# Patient Record
Sex: MALE | Race: BLACK OR AFRICAN AMERICAN | NOT HISPANIC OR LATINO | Employment: OTHER | URBAN - METROPOLITAN AREA
[De-identification: names, ages, dates, MRNs, and addresses within clinical notes are randomized per-mention and may not be internally consistent; named-entity substitution may affect disease eponyms.]

---

## 2022-08-09 ENCOUNTER — ANESTHESIA EVENT (OUTPATIENT)
Dept: CARDIOLOGY | Facility: HOSPITAL | Age: 62
DRG: 036 | End: 2022-08-09
Payer: MEDICARE

## 2022-08-10 ENCOUNTER — HOSPITAL ENCOUNTER (INPATIENT)
Facility: HOSPITAL | Age: 62
LOS: 1 days | Discharge: HOME | DRG: 036 | End: 2022-08-11
Attending: INTERNAL MEDICINE | Admitting: INTERNAL MEDICINE
Payer: MEDICARE

## 2022-08-10 ENCOUNTER — ANESTHESIA (OUTPATIENT)
Dept: CARDIOLOGY | Facility: HOSPITAL | Age: 62
DRG: 036 | End: 2022-08-10
Payer: MEDICARE

## 2022-08-10 DIAGNOSIS — I25.10 CAD (CORONARY ARTERY DISEASE): ICD-10-CM

## 2022-08-10 DIAGNOSIS — I65.22 STENOSIS OF LEFT CAROTID ARTERY: Primary | ICD-10-CM

## 2022-08-10 DIAGNOSIS — I65.22 OCCLUSION OF LEFT CAROTID ARTERY: ICD-10-CM

## 2022-08-10 LAB
POC ACTIVATED CLOTTING TIME K: 132 SEC (ref 74–137)
SAMPLE: NORMAL

## 2022-08-10 PROCEDURE — 25000003 PHARM REV CODE 250: Performed by: INTERNAL MEDICINE

## 2022-08-10 PROCEDURE — 63600175 PHARM REV CODE 636 W HCPCS

## 2022-08-10 PROCEDURE — C1876 STENT, NON-COA/NON-COV W/DEL: HCPCS | Performed by: INTERNAL MEDICINE

## 2022-08-10 PROCEDURE — 25000003 PHARM REV CODE 250: Performed by: STUDENT IN AN ORGANIZED HEALTH CARE EDUCATION/TRAINING PROGRAM

## 2022-08-10 PROCEDURE — 27201423 OPTIME MED/SURG SUP & DEVICES STERILE SUPPLY: Performed by: INTERNAL MEDICINE

## 2022-08-10 PROCEDURE — C1894 INTRO/SHEATH, NON-LASER: HCPCS | Performed by: INTERNAL MEDICINE

## 2022-08-10 PROCEDURE — 63600175 PHARM REV CODE 636 W HCPCS: Performed by: ANESTHESIOLOGY

## 2022-08-10 PROCEDURE — 37252 INTRVASC US NONCORONARY 1ST: CPT | Performed by: INTERNAL MEDICINE

## 2022-08-10 PROCEDURE — 25000003 PHARM REV CODE 250

## 2022-08-10 PROCEDURE — 12000002 HC ACUTE/MED SURGE SEMI-PRIVATE ROOM

## 2022-08-10 PROCEDURE — 93005 ELECTROCARDIOGRAM TRACING: CPT

## 2022-08-10 PROCEDURE — 25500020 PHARM REV CODE 255: Performed by: INTERNAL MEDICINE

## 2022-08-10 PROCEDURE — 63600175 PHARM REV CODE 636 W HCPCS: Performed by: STUDENT IN AN ORGANIZED HEALTH CARE EDUCATION/TRAINING PROGRAM

## 2022-08-10 PROCEDURE — 37215 TRANSCATH STENT CCA W/EPS: CPT | Mod: LT | Performed by: INTERNAL MEDICINE

## 2022-08-10 PROCEDURE — 99223 PR INITIAL HOSPITAL CARE,LEVL III: ICD-10-PCS | Mod: 57,,, | Performed by: THORACIC SURGERY (CARDIOTHORACIC VASCULAR SURGERY)

## 2022-08-10 PROCEDURE — C1769 GUIDE WIRE: HCPCS | Performed by: INTERNAL MEDICINE

## 2022-08-10 PROCEDURE — 37000009 HC ANESTHESIA EA ADD 15 MINS: Performed by: INTERNAL MEDICINE

## 2022-08-10 PROCEDURE — C1753 CATH, INTRAVAS ULTRASOUND: HCPCS | Performed by: INTERNAL MEDICINE

## 2022-08-10 PROCEDURE — 93010 EKG 12-LEAD: ICD-10-PCS | Mod: ,,, | Performed by: INTERNAL MEDICINE

## 2022-08-10 PROCEDURE — 99223 1ST HOSP IP/OBS HIGH 75: CPT | Mod: 57,,, | Performed by: THORACIC SURGERY (CARDIOTHORACIC VASCULAR SURGERY)

## 2022-08-10 PROCEDURE — 93010 ELECTROCARDIOGRAM REPORT: CPT | Mod: ,,, | Performed by: INTERNAL MEDICINE

## 2022-08-10 PROCEDURE — C1725 CATH, TRANSLUMIN NON-LASER: HCPCS | Performed by: INTERNAL MEDICINE

## 2022-08-10 PROCEDURE — 37000008 HC ANESTHESIA 1ST 15 MINUTES: Performed by: INTERNAL MEDICINE

## 2022-08-10 PROCEDURE — 25000003 PHARM REV CODE 250: Performed by: NURSE ANESTHETIST, CERTIFIED REGISTERED

## 2022-08-10 PROCEDURE — 63600175 PHARM REV CODE 636 W HCPCS: Performed by: NURSE ANESTHETIST, CERTIFIED REGISTERED

## 2022-08-10 DEVICE — 9 MM X 40 MM
Type: IMPLANTABLE DEVICE | Site: CAROTID | Status: FUNCTIONAL
Brand: ENROUTE TRANSCAROTID STENT

## 2022-08-10 RX ORDER — CETIRIZINE HYDROCHLORIDE 10 MG/1
10 TABLET ORAL DAILY
Status: DISCONTINUED | OUTPATIENT
Start: 2022-08-11 | End: 2022-08-11 | Stop reason: HOSPADM

## 2022-08-10 RX ORDER — PHENYLEPHRINE HCL IN 0.9% NACL 1 MG/10 ML
SYRINGE (ML) INTRAVENOUS
Status: DISCONTINUED | OUTPATIENT
Start: 2022-08-10 | End: 2022-08-10

## 2022-08-10 RX ORDER — HYDROMORPHONE HYDROCHLORIDE 2 MG/ML
0.4 INJECTION, SOLUTION INTRAMUSCULAR; INTRAVENOUS; SUBCUTANEOUS EVERY 5 MIN PRN
Status: DISCONTINUED | OUTPATIENT
Start: 2022-08-10 | End: 2022-08-11 | Stop reason: HOSPADM

## 2022-08-10 RX ORDER — SODIUM CHLORIDE 9 MG/ML
INJECTION, SOLUTION INTRAVENOUS ONCE
Status: ACTIVE | OUTPATIENT
Start: 2022-08-10

## 2022-08-10 RX ORDER — SODIUM CHLORIDE 0.9 % (FLUSH) 0.9 %
10 SYRINGE (ML) INJECTION
Status: ACTIVE | OUTPATIENT
Start: 2022-08-10

## 2022-08-10 RX ORDER — ASPIRIN 81 MG/1
81 TABLET ORAL DAILY
COMMUNITY

## 2022-08-10 RX ORDER — MIDAZOLAM HYDROCHLORIDE 1 MG/ML
INJECTION INTRAMUSCULAR; INTRAVENOUS
Status: DISCONTINUED | OUTPATIENT
Start: 2022-08-10 | End: 2022-08-10

## 2022-08-10 RX ORDER — ALPRAZOLAM 0.5 MG/1
2 TABLET ORAL 2 TIMES DAILY
Status: DISCONTINUED | OUTPATIENT
Start: 2022-08-10 | End: 2022-08-11 | Stop reason: HOSPADM

## 2022-08-10 RX ORDER — ACETAMINOPHEN 10 MG/ML
INJECTION, SOLUTION INTRAVENOUS
Status: COMPLETED
Start: 2022-08-10 | End: 2022-08-10

## 2022-08-10 RX ORDER — EZETIMIBE 10 MG/1
10 TABLET ORAL NIGHTLY
Status: DISCONTINUED | OUTPATIENT
Start: 2022-08-10 | End: 2022-08-11 | Stop reason: HOSPADM

## 2022-08-10 RX ORDER — KETOROLAC TROMETHAMINE 30 MG/ML
INJECTION, SOLUTION INTRAMUSCULAR; INTRAVENOUS
Status: COMPLETED
Start: 2022-08-10 | End: 2022-08-10

## 2022-08-10 RX ORDER — HEPARIN SODIUM 1000 [USP'U]/ML
INJECTION, SOLUTION INTRAVENOUS; SUBCUTANEOUS
Status: DISCONTINUED | OUTPATIENT
Start: 2022-08-10 | End: 2022-08-10

## 2022-08-10 RX ORDER — PROCHLORPERAZINE EDISYLATE 5 MG/ML
5 INJECTION INTRAMUSCULAR; INTRAVENOUS EVERY 30 MIN PRN
Status: DISCONTINUED | OUTPATIENT
Start: 2022-08-10 | End: 2022-08-11 | Stop reason: HOSPADM

## 2022-08-10 RX ORDER — HYDROMORPHONE HYDROCHLORIDE 2 MG/ML
0.2 INJECTION, SOLUTION INTRAMUSCULAR; INTRAVENOUS; SUBCUTANEOUS EVERY 5 MIN PRN
Status: DISCONTINUED | OUTPATIENT
Start: 2022-08-10 | End: 2022-08-11 | Stop reason: HOSPADM

## 2022-08-10 RX ORDER — PROTAMINE SULFATE 10 MG/ML
INJECTION, SOLUTION INTRAVENOUS
Status: DISCONTINUED | OUTPATIENT
Start: 2022-08-10 | End: 2022-08-10

## 2022-08-10 RX ORDER — MIDAZOLAM HYDROCHLORIDE 1 MG/ML
2 INJECTION INTRAMUSCULAR; INTRAVENOUS ONCE AS NEEDED
Status: CANCELLED | OUTPATIENT
Start: 2022-08-10 | End: 2034-01-06

## 2022-08-10 RX ORDER — OXYCODONE HCL 20 MG/1
TABLET, FILM COATED, EXTENDED RELEASE ORAL
Status: DISPENSED
Start: 2022-08-10 | End: 2022-08-11

## 2022-08-10 RX ORDER — SODIUM CHLORIDE, SODIUM GLUCONATE, SODIUM ACETATE, POTASSIUM CHLORIDE AND MAGNESIUM CHLORIDE 30; 37; 368; 526; 502 MG/100ML; MG/100ML; MG/100ML; MG/100ML; MG/100ML
1000 INJECTION, SOLUTION INTRAVENOUS CONTINUOUS
Status: CANCELLED | OUTPATIENT
Start: 2022-08-10 | End: 2022-09-09

## 2022-08-10 RX ORDER — ATORVASTATIN CALCIUM 40 MG/1
40 TABLET, FILM COATED ORAL DAILY
Status: DISCONTINUED | OUTPATIENT
Start: 2022-08-11 | End: 2022-08-11 | Stop reason: HOSPADM

## 2022-08-10 RX ORDER — PROMETHAZINE HYDROCHLORIDE 25 MG/ML
INJECTION, SOLUTION INTRAMUSCULAR; INTRAVENOUS
Status: DISPENSED
Start: 2022-08-10 | End: 2022-08-11

## 2022-08-10 RX ORDER — METHOCARBAMOL 100 MG/ML
INJECTION, SOLUTION INTRAMUSCULAR; INTRAVENOUS
Status: DISPENSED
Start: 2022-08-10 | End: 2022-08-11

## 2022-08-10 RX ORDER — AMLODIPINE BESYLATE 2.5 MG/1
2.5 TABLET ORAL EVERY MORNING
Status: DISCONTINUED | OUTPATIENT
Start: 2022-08-11 | End: 2022-08-11 | Stop reason: HOSPADM

## 2022-08-10 RX ORDER — SILDENAFIL 25 MG/1
50 TABLET, FILM COATED ORAL DAILY
Status: DISCONTINUED | OUTPATIENT
Start: 2022-08-11 | End: 2022-08-11 | Stop reason: HOSPADM

## 2022-08-10 RX ORDER — LEVOCETIRIZINE DIHYDROCHLORIDE 5 MG/1
5 TABLET, FILM COATED ORAL DAILY
COMMUNITY
Start: 2022-05-25

## 2022-08-10 RX ORDER — RANOLAZINE 1000 MG/1
1000 TABLET, EXTENDED RELEASE ORAL 2 TIMES DAILY
COMMUNITY
Start: 2022-08-09

## 2022-08-10 RX ORDER — ATORVASTATIN CALCIUM 40 MG/1
1 TABLET, FILM COATED ORAL DAILY
COMMUNITY
Start: 2022-07-19

## 2022-08-10 RX ORDER — ONDANSETRON 2 MG/ML
4 INJECTION INTRAMUSCULAR; INTRAVENOUS DAILY PRN
Status: DISCONTINUED | OUTPATIENT
Start: 2022-08-10 | End: 2022-08-11 | Stop reason: HOSPADM

## 2022-08-10 RX ORDER — RANOLAZINE 500 MG/1
1000 TABLET, EXTENDED RELEASE ORAL 2 TIMES DAILY
Status: DISCONTINUED | OUTPATIENT
Start: 2022-08-10 | End: 2022-08-11 | Stop reason: HOSPADM

## 2022-08-10 RX ORDER — SILDENAFIL 50 MG/1
50 TABLET, FILM COATED ORAL DAILY
COMMUNITY
Start: 2022-06-23

## 2022-08-10 RX ORDER — METOPROLOL TARTRATE 25 MG/1
50 TABLET, FILM COATED ORAL 2 TIMES DAILY
Status: DISCONTINUED | OUTPATIENT
Start: 2022-08-10 | End: 2022-08-11 | Stop reason: HOSPADM

## 2022-08-10 RX ORDER — IPRATROPIUM BROMIDE AND ALBUTEROL SULFATE 2.5; .5 MG/3ML; MG/3ML
3 SOLUTION RESPIRATORY (INHALATION)
Status: DISCONTINUED | OUTPATIENT
Start: 2022-08-10 | End: 2022-08-11 | Stop reason: HOSPADM

## 2022-08-10 RX ORDER — ALPRAZOLAM 2 MG/1
2 TABLET ORAL 2 TIMES DAILY
COMMUNITY
Start: 2022-05-06

## 2022-08-10 RX ORDER — ROCURONIUM BROMIDE 10 MG/ML
INJECTION, SOLUTION INTRAVENOUS
Status: DISCONTINUED | OUTPATIENT
Start: 2022-08-10 | End: 2022-08-10

## 2022-08-10 RX ORDER — PHENYLEPHRINE HYDROCHLORIDE 10 MG/ML
INJECTION INTRAVENOUS CONTINUOUS PRN
Status: DISCONTINUED | OUTPATIENT
Start: 2022-08-10 | End: 2022-08-10

## 2022-08-10 RX ORDER — AMLODIPINE BESYLATE 2.5 MG/1
2.5 TABLET ORAL EVERY MORNING
COMMUNITY
Start: 2022-08-09

## 2022-08-10 RX ORDER — PROPOFOL 10 MG/ML
VIAL (ML) INTRAVENOUS
Status: DISCONTINUED | OUTPATIENT
Start: 2022-08-10 | End: 2022-08-10

## 2022-08-10 RX ORDER — HYDROMORPHONE HYDROCHLORIDE 2 MG/ML
INJECTION, SOLUTION INTRAMUSCULAR; INTRAVENOUS; SUBCUTANEOUS
Status: COMPLETED
Start: 2022-08-10 | End: 2022-08-10

## 2022-08-10 RX ORDER — ONDANSETRON 4 MG/1
8 TABLET, ORALLY DISINTEGRATING ORAL EVERY 6 HOURS PRN
Status: CANCELLED | OUTPATIENT
Start: 2022-08-10

## 2022-08-10 RX ORDER — ASPIRIN 81 MG/1
81 TABLET ORAL DAILY
Status: DISCONTINUED | OUTPATIENT
Start: 2022-08-11 | End: 2022-08-11 | Stop reason: HOSPADM

## 2022-08-10 RX ORDER — LIDOCAINE HYDROCHLORIDE 10 MG/ML
1 INJECTION, SOLUTION EPIDURAL; INFILTRATION; INTRACAUDAL; PERINEURAL ONCE
Status: CANCELLED | OUTPATIENT
Start: 2022-08-10 | End: 2022-08-10

## 2022-08-10 RX ORDER — LIDOCAINE HYDROCHLORIDE 20 MG/ML
INJECTION INTRAVENOUS
Status: DISCONTINUED | OUTPATIENT
Start: 2022-08-10 | End: 2022-08-10

## 2022-08-10 RX ORDER — CLOPIDOGREL BISULFATE 75 MG/1
75 TABLET ORAL DAILY
Status: DISCONTINUED | OUTPATIENT
Start: 2022-08-11 | End: 2022-08-11 | Stop reason: HOSPADM

## 2022-08-10 RX ORDER — CLOPIDOGREL BISULFATE 75 MG/1
75 TABLET ORAL DAILY
COMMUNITY
Start: 2022-07-13

## 2022-08-10 RX ORDER — FENTANYL CITRATE 50 UG/ML
INJECTION, SOLUTION INTRAMUSCULAR; INTRAVENOUS
Status: DISCONTINUED | OUTPATIENT
Start: 2022-08-10 | End: 2022-08-10

## 2022-08-10 RX ORDER — NEBIVOLOL 10 MG/1
10 TABLET ORAL DAILY
COMMUNITY
Start: 2022-07-13

## 2022-08-10 RX ORDER — MEPERIDINE HYDROCHLORIDE 25 MG/ML
12.5 INJECTION INTRAMUSCULAR; INTRAVENOUS; SUBCUTANEOUS EVERY 10 MIN PRN
Status: DISCONTINUED | OUTPATIENT
Start: 2022-08-10 | End: 2022-08-11 | Stop reason: HOSPADM

## 2022-08-10 RX ORDER — EZETIMIBE 10 MG/1
1 TABLET ORAL NIGHTLY
COMMUNITY
Start: 2022-07-13

## 2022-08-10 RX ADMIN — RANOLAZINE 1000 MG: 500 TABLET, EXTENDED RELEASE ORAL at 08:08

## 2022-08-10 RX ADMIN — MIDAZOLAM 2 MG: 1 INJECTION INTRAMUSCULAR; INTRAVENOUS at 02:08

## 2022-08-10 RX ADMIN — PHENYLEPHRINE HYDROCHLORIDE 35 MCG/MIN: 10 INJECTION INTRAVENOUS at 03:08

## 2022-08-10 RX ADMIN — HYDROMORPHONE HYDROCHLORIDE 0.4 MG: 2 INJECTION, SOLUTION INTRAMUSCULAR; INTRAVENOUS; SUBCUTANEOUS at 06:08

## 2022-08-10 RX ADMIN — HEPARIN SODIUM 8000 UNITS: 1000 INJECTION, SOLUTION INTRAVENOUS; SUBCUTANEOUS at 03:08

## 2022-08-10 RX ADMIN — Medication 100 MCG: at 04:08

## 2022-08-10 RX ADMIN — EZETIMIBE 10 MG: 10 TABLET ORAL at 08:08

## 2022-08-10 RX ADMIN — VANCOMYCIN HYDROCHLORIDE 1000 MG: 1 INJECTION, POWDER, LYOPHILIZED, FOR SOLUTION INTRAVENOUS at 03:08

## 2022-08-10 RX ADMIN — PROPOFOL 150 MG: 10 INJECTION, EMULSION INTRAVENOUS at 02:08

## 2022-08-10 RX ADMIN — ACETAMINOPHEN 1000 MG: 10 INJECTION, SOLUTION INTRAVENOUS at 05:08

## 2022-08-10 RX ADMIN — Medication 200 MCG: at 03:08

## 2022-08-10 RX ADMIN — LIDOCAINE HYDROCHLORIDE 80 MG: 20 INJECTION INTRAVENOUS at 02:08

## 2022-08-10 RX ADMIN — SODIUM CHLORIDE, SODIUM GLUCONATE, SODIUM ACETATE, POTASSIUM CHLORIDE AND MAGNESIUM CHLORIDE: 526; 502; 368; 37; 30 INJECTION, SOLUTION INTRAVENOUS at 02:08

## 2022-08-10 RX ADMIN — KETOROLAC TROMETHAMINE 30 MG: 30 INJECTION, SOLUTION INTRAMUSCULAR; INTRAVENOUS at 05:08

## 2022-08-10 RX ADMIN — HYDROMORPHONE HYDROCHLORIDE 0.4 MG: 2 INJECTION, SOLUTION INTRAMUSCULAR; INTRAVENOUS; SUBCUTANEOUS at 05:08

## 2022-08-10 RX ADMIN — ROCURONIUM BROMIDE 40 MG: 10 SOLUTION INTRAVENOUS at 02:08

## 2022-08-10 RX ADMIN — PROTAMINE SULFATE 80 MG: 10 INJECTION, SOLUTION INTRAVENOUS at 04:08

## 2022-08-10 RX ADMIN — SUGAMMADEX 200 MG: 100 INJECTION, SOLUTION INTRAVENOUS at 04:08

## 2022-08-10 RX ADMIN — ALPRAZOLAM 2 MG: 0.5 TABLET ORAL at 08:08

## 2022-08-10 RX ADMIN — FENTANYL CITRATE 100 MCG: 50 INJECTION, SOLUTION INTRAMUSCULAR; INTRAVENOUS at 02:08

## 2022-08-10 RX ADMIN — ROCURONIUM BROMIDE 20 MG: 10 SOLUTION INTRAVENOUS at 03:08

## 2022-08-10 NOTE — ANESTHESIA PREPROCEDURE EVALUATION
08/10/2022  Javy Gunn is a 62 y.o., male presents for cerebral angiogram and possible left carotid stent and due to a symptomatic carotid stenosis.  The patient noted approximately 10 years out from CABG with a subsequent coronary stents 4-5 years ago and a stress test which was reassuring 1-2 years ago.    Transthoracic echo April 2022  EF 50%  moderate to severe TR, mild MR, mild PI, trace AI  PA systolic pressure 46    Last 3 sets of Vitals    Vitals - 1 value per visit 8/9/2022 8/10/2022   SYSTOLIC - 125   DIASTOLIC - 76   Pulse - 60   Temp - 98.1   SPO2 - 98   Weight (lb) 147 -   Weight (kg) 66.679 -   Height 65 -   BMI (Calculated) 24.5 -         Lab Results   Component Value Date    WBC 6.4 05/22/2022    HGB 12.8 05/22/2022    HCT 39.1 05/22/2022    MCV 98.0 05/22/2022     05/22/2022          BMP  Lab Results   Component Value Date     05/22/2022    K 3.5 05/22/2022     05/22/2022    CO2 27 05/22/2022    BUN 10 05/22/2022    CREATININE 0.72 05/22/2022    CALCIUM 9.3 05/22/2022    ESTGFRAFRICA >90 05/22/2022    EGFRNONAA >90 05/22/2022        CMP  Sodium   Date Value Ref Range Status   05/22/2022 139 136 - 147 mmol/L Final     Potassium   Date Value Ref Range Status   05/22/2022 3.5 3.5 - 5.1 mmol/L Final     Chloride   Date Value Ref Range Status   05/22/2022 105 98 - 107 mmol/L Final     CO2   Date Value Ref Range Status   05/22/2022 27 20 - 31 mmol/L Final     BUN   Date Value Ref Range Status   05/22/2022 10 9 - 23 mg/dL Final     Creatinine   Date Value Ref Range Status   05/22/2022 0.72 0.70 - 1.30 mg/dL Final     Calcium   Date Value Ref Range Status   05/22/2022 9.3 8.3 - 10.6 mg/dL Final     eGFR    Date Value Ref Range Status   05/22/2022 >90 >90 mL/min/1.73m2 Final     Comment:     CKD-EPI GFR INTERPRETATION GUIDELINES  Estimated using CKD-EPI  equation based on standardized serum creatinine, age, and sex    DESCRIPTION               eGFR mL/min/1.73m2  Mild Decrease                           60 - 89  Moderate Decrease                       30 - 59  Severe Decrease                         15 - 29  Kidney Failure                          <15 (or dialysis)     eGFR   Date Value Ref Range Status   05/22/2022 >90 >90 mL/min/1.73m2 Final        Pre-op Assessment    I have reviewed the Patient Summary Reports.     I have reviewed the Nursing Notes. I have reviewed the NPO Status.   I have reviewed the Medications.     Review of Systems  Anesthesia Hx:  Personal Hx of Anesthesia complications Denies Post-Operative Nausea/Vomiting.  Denies Difficult Intubation.  Denies Dental Injury.   Social:  Non-Smoker    Cardiovascular:   Hypertension  Functional Capacity 3 METS  Coronary Artery Disease: S/P Percutaneous Coronary Intervention (PCI) S/P Aorto-Coronary Bypass Graft Surgery (CABG):  Valvular Heart Disease: Tricuspid Regurgitation (TR), severe   Hypertension    Pulmonary:  Pulmonary Normal        Physical Exam  General: Well nourished, Cooperative, Alert and Oriented    Airway:  Mallampati: II   Mouth Opening: Normal  TM Distance: Normal  Tongue: Normal  Neck ROM: Normal ROM    Dental:  Intact    Chest/Lungs:  Clear to auscultation, Normal Respiratory Rate    Heart:  Rate: Normal  Rhythm: Regular Rhythm        Anesthesia Plan  Type of Anesthesia, risks & benefits discussed:    Anesthesia Type: Gen ETT  Intra-op Monitoring Plan: Standard ASA Monitors and Art Line  Post Op Pain Control Plan: multimodal analgesia and IV/PO Opioids PRN  Induction:  IV  Airway Plan: Direct  Informed Consent: Informed consent signed with the Patient and all parties understand the risks and agree with anesthesia plan.  All questions answered.   ASA Score: 3  Day of Surgery Review of History & Physical: H&P Update referred to the surgeon/provider.    Ready For Surgery From Anesthesia  Perspective.     .

## 2022-08-10 NOTE — CONSULTS
[facility] - Cath Lab Services  Cardiothoracic Surgery  Consult Note    Patient Name: Javy Gunn  MRN: [id number]  Admission Date: 8/10/2022  Attending Physician: Vaibhav Enriquez MD  Referring Provider: Vaibhav Enriquez MD    Patient information was obtained from patient, ER records and primary team.     Inpatient consult to Cardiothoracic Surgery  Consult performed by: Chi Rice MD  Consult ordered by: Vaibhav Enriquez MD  Reason for consult:  left internal carotid artery stenosis  Assessment/Recommendations: Patient is a 62-year-old male found to have a severe left internal carotid artery stenosis judged to be 80% by NASCET criteria on recent CTA.  I agree with the recommendation for left carotid endarterectomy versus left TCAR procedure.  Risks, benefits, and alternatives have been discussed.  Questions have been answered.  Patient voices understanding and agrees to proceed with TCAR procedure        Subjective:     Chief Complaint/Reason for Admission: Neck blockage    History of Present Illness: Patient is a 62-year-old black male long known to [facility] with a past medical history coronary artery disease status post coronary artery bypass grafting, peripheral arterial disease, and recent discovery of an 80% left internal carotid artery stenosis.    The patient denies fever, chills, nausea, vomiting, headache, syncope, visual changes, lateralizing neurological signs or symptoms.    No current facility-administered medications on file prior to encounter.     Current Outpatient Medications on File Prior to Encounter   Medication Sig    ALPRAZolam (XANAX) 2 MG Tab Take 2 mg by mouth 2 (two) times daily.    amLODIPine (NORVASC) 2.5 MG tablet Take 2.5 mg by mouth every morning.    aspirin (ECOTRIN) 81 MG EC tablet Take 81 mg by mouth once daily.    atorvastatin (LIPITOR) 40 MG tablet Take 1 tablet by mouth once daily.    clopidogreL (PLAVIX) 75 mg tablet Take 75 mg by mouth once daily.    ezetimibe  (ZETIA) 10 mg tablet Take 1 tablet by mouth every evening.    levocetirizine (XYZAL) 5 MG tablet Take 5 mg by mouth once daily.    nebivoloL (BYSTOLIC) 10 MG Tab Take 10 mg by mouth once daily.    ranolazine (RANEXA) 1,000 mg Tb12 Take 1,000 mg by mouth 2 (two) times daily.    sildenafiL (VIAGRA) 50 MG tablet Take 50 mg by mouth once daily.       Review of patient's allergies indicates:   Allergen Reactions    Penicillins Rash       Past Medical History:   Diagnosis Date    Anxiety     Arthritis     Carotid stenosis     Chest pain     Coronary artery disease     DVT (deep venous thrombosis)     Hyperlipidemia     Hypertension     Other pulmonary embolism without acute cor pulmonale     Valvular heart disease      Past Surgical History:   Procedure Laterality Date    ARM REPLANTATION Left 1985    COLONOSCOPY      CORONARY ARTERY BYPASS GRAFT (CABG)      CORONARY STENT PLACEMENT      ESOPHAGOGASTRODUODENOSCOPY      IVC FILTER RETRIEVAL       Family History    None       Tobacco Use    Smoking status: Never Smoker    Smokeless tobacco: Never Used   Substance and Sexual Activity    Alcohol use: Yes     Alcohol/week: 7.0 standard drinks     Types: 7 Shots of liquor per week     Comment: 1 cocktail daily    Drug use: Never    Sexual activity: Not on file     Review of Systems   Constitutional: Negative for activity change, chills, diaphoresis and fever.   HENT: Positive for sore throat. Negative for congestion, dental problem, drooling, mouth sores, trouble swallowing and voice change.    Eyes: Negative.    Respiratory: Negative for apnea, cough, choking, chest tightness, shortness of breath, wheezing and stridor.    Cardiovascular: Negative for chest pain, palpitations and leg swelling.   Gastrointestinal: Negative for abdominal distention, abdominal pain, blood in stool, nausea and vomiting.   Endocrine: Negative.    Musculoskeletal: Negative for arthralgias, back pain, gait problem, neck pain  and neck stiffness.   Skin: Negative for color change, pallor, rash and wound.   Allergic/Immunologic: Negative.    Neurological: Positive for headaches. Negative for dizziness, seizures, syncope, facial asymmetry, weakness and light-headedness.   Hematological: Negative for adenopathy.   Psychiatric/Behavioral: Negative for agitation and hallucinations.     Objective:     Vital Signs (Most Recent):  Temp: 98.1 °F (36.7 °C) (08/10/22 0755)  Pulse: 60 (08/10/22 0755)  BP: 125/76 (08/10/22 0755)  SpO2: 98 % (08/10/22 0755) Vital Signs (24h Range):  Temp:  [98.1 °F (36.7 °C)] 98.1 °F (36.7 °C)  Pulse:  [60] 60  SpO2:  [98 %] 98 %  BP: (125)/(76) 125/76     Weight: 66.3 kg (146 lb 2.6 oz)  Body mass index is 24.32 kg/m².    SpO2: 98 %        Intake/Output - Last 3 Shifts     None           Lines/Drains/Airways     Arterial Line  Duration           Arterial Line 08/10/22 1300 Radial <1 day          Peripheral Intravenous Line  Duration                Peripheral IV - Single Lumen 08/10/22 0915 20 G Anterior;Proximal;Right Forearm <1 day                 STS Risk Score: n/a    Physical Exam  Vitals and nursing note reviewed.   Constitutional:       General: He is not in acute distress.     Appearance: Normal appearance.      Comments: Frail   HENT:      Head: Normocephalic and atraumatic.      Nose: Nose normal. No congestion.      Mouth/Throat:      Mouth: Mucous membranes are moist.   Eyes:      General: No scleral icterus.     Extraocular Movements: Extraocular movements intact.      Conjunctiva/sclera: Conjunctivae normal.      Pupils: Pupils are equal, round, and reactive to light.   Neck:      Thyroid: No thyroid mass or thyromegaly.      Vascular: Carotid bruit present. No JVD.   Cardiovascular:      Rate and Rhythm: Normal rate and regular rhythm.      Pulses: Normal pulses.           Carotid pulses are 2+ on the right side and 2+ on the left side.       Radial pulses are 2+ on the right side and 2+ on the left  side.        Femoral pulses are 2+ on the right side and 2+ on the left side.       Dorsalis pedis pulses are 2+ on the right side and 2+ on the left side.        Posterior tibial pulses are 2+ on the right side and 2+ on the left side.      Heart sounds: No murmur heard.    No friction rub. No gallop.   Pulmonary:      Effort: Pulmonary effort is normal. No respiratory distress.      Breath sounds: Normal breath sounds. No stridor. No wheezing, rhonchi or rales.   Chest:      Chest wall: No tenderness.   Breasts:      Right: No axillary adenopathy or supraclavicular adenopathy.      Left: No axillary adenopathy or supraclavicular adenopathy.       Abdominal:      General: Abdomen is flat. Bowel sounds are normal. There is no distension.      Palpations: Abdomen is soft. There is no hepatomegaly, splenomegaly, mass or pulsatile mass.      Tenderness: There is no abdominal tenderness. There is no rebound.   Musculoskeletal:         General: No swelling. Normal range of motion.      Cervical back: Normal range of motion. No rigidity or tenderness.      Right lower leg: No edema.      Left lower leg: No edema.   Lymphadenopathy:      Cervical: No cervical adenopathy.      Upper Body:      Right upper body: No supraclavicular or axillary adenopathy.      Left upper body: No supraclavicular or axillary adenopathy.   Skin:     General: Skin is warm and dry.   Neurological:      General: No focal deficit present.      Mental Status: He is alert and oriented to person, place, and time. Mental status is at baseline.      Cranial Nerves: No cranial nerve deficit.      Sensory: No sensory deficit.      Motor: No weakness.      Gait: Gait normal.   Psychiatric:         Mood and Affect: Mood normal.         Significant Labs:  All pertinent labs from the last 24 hours have been reviewed.    Significant Diagnostics:  I have reviewed and interpreted all pertinent imaging results/findings within the past 24  hours.    Assessment/Plan:     NYHA Score: NYHA I: cardiac disease, but without resulting limitations of physical activity    There are no hospital problems to display for this patient.      Thank you for your consult. I will follow-up with patient. Please contact us if you have any additional questions.    Chi Rice MD  Cardiothoracic Surgery  [facility] - Cath Lab Services

## 2022-08-10 NOTE — ANESTHESIA PROCEDURE NOTES
Peripheral IV Insertion    Diagnosis: I99.8 Other disorder of circulatory system    Patient location during procedure: OR  Procedure end time: 8/10/2022 3:00 PM    Staffing  Authorizing Provider: Juan Ramon Gunn Jr., MD  Performing Provider: Juan Ramon Gunn Jr., MD    Anesthesiologist was present at the time of the procedure.    Preanesthetic Checklist  Completed: patient identified, IV checked, site marked, risks and benefits discussed, surgical consent, monitors and equipment checked, pre-op evaluation, timeout performed and anesthesia consent givenPeripheral IV Insertion  Skin Prep: alcohol swabs  Local Infiltration: none  Orientation: right  Location: upper arm  Catheter Type: peripheral IV (single lumen)  Catheter Size: 22 G  Catheter placement by Anatomical landmarks. Heme positive aspiration all ports. Insertion Attempts: 2  Assessment  Dressing: secured with tape and tegaderm  Patient: Tolerated well  Line flushed easily.

## 2022-08-10 NOTE — OP NOTE
[facility]  Cath Lab Services  Cardiothoracic Surgery  Operative Note    SUMMARY     Date of Procedure: 8/10/2022     Procedure:  1. Left carotid TCAR procedure  2. Left carotid artery IVUS  3. Left carotid artery percutaneous transluminal lithotripsy     Co-Surgeon: SILVINA Rice / Vaibhav Enriquez    Assistant: Rocky Barbosa    Pre-Operative Diagnosis:  1.  Severe, symptomatic left internal carotid artery stenosis  2. Coronary artery disease     Post-Operative Diagnosis:  Same    Anesthesia: General    Operative Findings (including complications, if any):  Severely calcified left internal carotid artery stenosis with the appearance of an old healed dissection    Description of Technical Procedures:  The patient was lived to the cath lab and prepped and draped in the usual sterile fashion.  A right supraclavicular incision was made in a transverse fashion the subcutaneous tissues were dissected with the Bovie electric cautery.  Common carotid artery was identified and encircled with a vessel loop.  A 5 0 Prolene suture was used to make a pursestring in the anterior surface of the carotid artery.  Left common femoral vein access was obtained with ultrasound guidance and a 9 [place] femoral access sheath was placed in the usual fashion.  The right common carotid artery was then accessed with a micropuncture needle and a micropuncture sheath was inserted.  Diagnostic carotid angiogram was performed.  The diagnostic sheath was then replaced with an Amplatz Super Stiff wire and the working sheath for the TCAR procedure was inserted under fluoroscopic guidance.  The sheath system was connected to the femoral vein and the [place] flow reversal device was assessed and found to be working appropriately.  The internal carotid artery was then accessed with a 0.014 mm wire and intravascular ultrasound was performed to assess the degree of stenosis.  Following this procedure  a balloon lithotripsy was  performed on the the internal carotid artery lesion.  This balloon was inflated to nominal pressure and then released.  The  [place] en route stent system was then passed over the wire and delivered to the area in question and deployed in the usual fashion.  Completion angiography revealed that the stent was well expanded and there was no evidence of stenosis.  The delivery system was removed and the carotid sheath was then removed with tying of the 5 0 Prolene suture with hemostasis.  Subcutaneous tissues were irrigated with saline irrigation and closed in layers with Vicryl.  Patient tolerated the procedure well and will be delivered to the recovery room in stable condition.    Estimated Blood Loss (EBL): 5 mL          Specimens:   Specimen (24h ago, onward)            None                  Condition: Stable    Disposition: PACU - hemodynamically stable.

## 2022-08-10 NOTE — TRANSFER OF CARE
"Anesthesia Transfer of Care Note    Patient: Javy Gunn    Procedure(s) Performed: Procedure(s) (LRB):  ANGIOGRAM, CAROTID (N/A)  Stent, Carotid W/ Protect    Patient location: PACU    Transport from OR: Transported from OR on room air with adequate spontaneous ventilation    Post pain: adequate analgesia    Post vital signs: stable    Level of consciousness: awake and alert    Nausea/Vomiting: no nausea/vomiting    Complications: none    Transfer of care protocol was followed      Last vitals:   Visit Vitals  /76   Pulse 60   Temp 36.7 °C (98.1 °F) (Oral)   Ht 5' 5" (1.651 m)   Wt 66.3 kg (146 lb 2.6 oz)   SpO2 98%   BMI 24.32 kg/m²     "

## 2022-08-10 NOTE — Clinical Note
The groin and left neck was prepped. The site was prepped with ChloraPrep and Ioban. The site was clipped. The patient was draped. The patient was positioned supine. The patient was secured with ulnar pads.

## 2022-08-10 NOTE — BRIEF OP NOTE
[facility] - Cath Lab Services  Brief Operative Note  Cardiology    SUMMARY     Surgery Date: 8/10/2022     Surgeon(s) and Role:     * Vaibhav Enriquez MD - Primary     * Chi Rice MD - Co-Surgeon    Pre-op Diagnosis:  Occlusion of left carotid artery [I65.22]    Post-op Diagnosis: Post-Op Diagnosis Codes:     * Occlusion of left carotid artery [I65.22]    Procedure Performed: TCAR        Operative Findings: TCAR performed for left internal carotid artery.  75% stenosis was noted.  Flow reversal was performed. Balloon angioplasty and stenting was performed successfully.  Patient to continue on aspirin and Plavix. Patient will be admitted overnight.  Aspirin and Plavix to be continued with statins on DC     Full report to follow

## 2022-08-10 NOTE — ANESTHESIA PROCEDURE NOTES
Intubation    Date/Time: 8/10/2022 2:50 PM  Performed by: Barb Palumbo CRNA  Authorized by: Juan Ramon Gunn Jr., MD     Intubation:     Induction:  Intravenous    Intubated:  Postinduction    Mask Ventilation:  Easy mask    Attempts:  1    Attempted By:  Staff anesthesiologist and CRNA    Method of Intubation:  Direct    Blade:  Puente 2    Laryngeal View Grade: Grade I - full view of cords      Difficult Airway Encountered?: No      Complications:  None    Airway Device:  Oral endotracheal tube    Airway Device Size:  7.5    Style/Cuff Inflation:  Cuffed    Inflation Amount (mL):  7    Tube secured:  22    Secured at:  The lips    Placement Verified By:  Capnometry    Complicating Factors:  None    Findings Post-Intubation:  BS equal bilateral and atraumatic/condition of teeth unchanged

## 2022-08-10 NOTE — ANESTHESIA PROCEDURE NOTES
Arterial    Diagnosis: CAD    Patient location during procedure: done in OR  Procedure start time: 8/10/2022 2:38 PM  Timeout: 8/10/2022 2:37 PM  Procedure end time: 8/10/2022 2:41 PM    Staffing  Authorizing Provider: Juan Ramon Gunn Jr., MD  Performing Provider: Barb Palumbo CRNA    Anesthesiologist was present at the time of the procedure.    Preanesthetic Checklist  Completed: patient identified, IV checked, site marked, risks and benefits discussed, surgical consent, monitors and equipment checked, pre-op evaluation, timeout performed and anesthesia consent givenArterial  Skin Prep: alcohol swabs  Local Infiltration: lidocaine  Orientation: right  Location: radial    Catheter Size: 20 G  Catheter placement by Anatomical landmarks. Heme positive aspiration all ports. Insertion Attempts: 1  Assessment  Dressing: secured with tape and tegaderm  Patient: Tolerated well

## 2022-08-10 NOTE — Clinical Note
dry, intact, no bleeding and no hematoma. Right groin sheath left in place. Site covered with tegaderm.

## 2022-08-11 VITALS
HEART RATE: 74 BPM | SYSTOLIC BLOOD PRESSURE: 110 MMHG | WEIGHT: 146.19 LBS | DIASTOLIC BLOOD PRESSURE: 65 MMHG | RESPIRATION RATE: 18 BRPM | HEIGHT: 65 IN | BODY MASS INDEX: 24.36 KG/M2 | TEMPERATURE: 98 F | OXYGEN SATURATION: 98 %

## 2022-08-11 PROBLEM — I65.29 CAROTID STENOSIS: Status: ACTIVE | Noted: 2022-08-11

## 2022-08-11 LAB
BASOPHILS # BLD AUTO: 0.02 X10(3)/MCL (ref 0–0.2)
BASOPHILS NFR BLD AUTO: 0.3 %
EOSINOPHIL # BLD AUTO: 0.03 X10(3)/MCL (ref 0–0.9)
EOSINOPHIL NFR BLD AUTO: 0.5 %
ERYTHROCYTE [DISTWIDTH] IN BLOOD BY AUTOMATED COUNT: 11.9 % (ref 11.5–17)
HCT VFR BLD AUTO: 39.5 % (ref 42–52)
HGB BLD-MCNC: 12.5 GM/DL (ref 14–18)
IMM GRANULOCYTES # BLD AUTO: 0.02 X10(3)/MCL (ref 0–0.04)
IMM GRANULOCYTES NFR BLD AUTO: 0.3 %
LYMPHOCYTES # BLD AUTO: 0.51 X10(3)/MCL (ref 0.6–4.6)
LYMPHOCYTES NFR BLD AUTO: 7.7 %
MCH RBC QN AUTO: 32.1 PG (ref 27–31)
MCHC RBC AUTO-ENTMCNC: 31.6 MG/DL (ref 33–36)
MCV RBC AUTO: 101.3 FL (ref 80–94)
MONOCYTES # BLD AUTO: 0.47 X10(3)/MCL (ref 0.1–1.3)
MONOCYTES NFR BLD AUTO: 7.1 %
NEUTROPHILS # BLD AUTO: 5.6 X10(3)/MCL (ref 2.1–9.2)
NEUTROPHILS NFR BLD AUTO: 84.1 %
NRBC BLD AUTO-RTO: 0 %
PLATELET # BLD AUTO: 151 X10(3)/MCL (ref 130–400)
PMV BLD AUTO: 10.6 FL (ref 7.4–10.4)
RBC # BLD AUTO: 3.9 X10(6)/MCL (ref 4.7–6.1)
WBC # SPEC AUTO: 6.7 X10(3)/MCL (ref 4.5–11.5)

## 2022-08-11 PROCEDURE — 36415 COLL VENOUS BLD VENIPUNCTURE: CPT | Performed by: INTERNAL MEDICINE

## 2022-08-11 PROCEDURE — 25000003 PHARM REV CODE 250: Performed by: INTERNAL MEDICINE

## 2022-08-11 PROCEDURE — 63600175 PHARM REV CODE 636 W HCPCS: Performed by: ANESTHESIOLOGY

## 2022-08-11 PROCEDURE — 85025 COMPLETE CBC W/AUTO DIFF WBC: CPT | Performed by: INTERNAL MEDICINE

## 2022-08-11 RX ORDER — OXYCODONE AND ACETAMINOPHEN 10; 325 MG/1; MG/1
1 TABLET ORAL EVERY 4 HOURS PRN
Status: DISCONTINUED | OUTPATIENT
Start: 2022-08-11 | End: 2022-08-11 | Stop reason: HOSPADM

## 2022-08-11 RX ORDER — OXYCODONE AND ACETAMINOPHEN 5; 325 MG/1; MG/1
1 TABLET ORAL EVERY 4 HOURS PRN
Qty: 15 TABLET | Refills: 0 | Status: SHIPPED | OUTPATIENT
Start: 2022-08-11

## 2022-08-11 RX ADMIN — CETIRIZINE HYDROCHLORIDE 10 MG: 10 TABLET, FILM COATED ORAL at 08:08

## 2022-08-11 RX ADMIN — RANOLAZINE 1000 MG: 500 TABLET, EXTENDED RELEASE ORAL at 08:08

## 2022-08-11 RX ADMIN — CLOPIDOGREL 75 MG: 75 TABLET, FILM COATED ORAL at 08:08

## 2022-08-11 RX ADMIN — ATORVASTATIN CALCIUM 40 MG: 40 TABLET, FILM COATED ORAL at 08:08

## 2022-08-11 RX ADMIN — OXYCODONE AND ACETAMINOPHEN 1 TABLET: 10; 325 TABLET ORAL at 10:08

## 2022-08-11 RX ADMIN — MEPERIDINE HYDROCHLORIDE 12.5 MG: 25 INJECTION INTRAMUSCULAR; INTRAVENOUS; SUBCUTANEOUS at 03:08

## 2022-08-11 RX ADMIN — AMLODIPINE BESYLATE 2.5 MG: 2.5 TABLET ORAL at 08:08

## 2022-08-11 RX ADMIN — SILDENAFIL 50 MG: 25 TABLET, FILM COATED ORAL at 08:08

## 2022-08-11 RX ADMIN — ASPIRIN 81 MG: 81 TABLET, COATED ORAL at 08:08

## 2022-08-11 RX ADMIN — METOPROLOL TARTRATE 50 MG: 25 TABLET, FILM COATED ORAL at 08:08

## 2022-08-11 NOTE — DISCHARGE SUMMARY
[facility] - Cath Lab Services  Cardiology  Discharge Summary    Patient Name: Javy Gunn  MRN: [id number]  Admission Date: 8/10/2022  Hospital Length of Stay: 1 days  Code Status: No Order   Attending Physician: No att. providers found   Primary Care Physician: Dolly Todd MD  Expected Discharge Date: 8/11/2022  Principal Problem:Carotid stenosis    Subjective:     Brief HPI:   Mr. Gunn is a 62 year old, known to Dr. Enriquez, who presented to [facility] on 8.10.22 for a scheduled TCAR. He has a history of CHF, carotid bruit, DM 2, HTN, DM 2, CAD s/p CABG, insomnia, and hypothyroidism. He was c/o numbness to his fingertips prior to his procedure. On 8.10.22, he underwent a successful TCAR and will be d/c'd home today.    Hospital Course:   TCAR 8.10.22  The Ost L ICA to Prox L ICA lesion was 75% stenosed with 20% stenosis post-intervention.  A STENT ENROUTE CAROTID [id number] stent was successfully placed.         Review of Systems   Cardiovascular: Negative.    Neurological: Negative.    All other systems reviewed and are negative.      Objective:     Vital Signs (Most Recent):  Temp: 97.9 °F (36.6 °C) (08/11/22 1100)  Pulse: 74 (08/11/22 1100)  Resp: 18 (08/11/22 1100)  BP: 110/65 (08/11/22 1100)  SpO2: 98 % (08/11/22 1100) Vital Signs (24h Range):  Temp:  [97.9 °F (36.6 °C)] 97.9 °F (36.6 °C)  Pulse:  [48-74] 74  Resp:  [0-23] 18  SpO2:  [90 %-100 %] 98 %  BP: ()/(54-84) 110/65     Weight: 66.3 kg (146 lb 2.6 oz)  Body mass index is 24.32 kg/m².    SpO2: 98 %  O2 Device (Oxygen Therapy): ventilator      Intake/Output Summary (Last 24 hours) at 8/11/2022 1203  Last data filed at 8/11/2022 0500  Gross per 24 hour   Intake 1170 ml   Output 375 ml   Net 795 ml       Lines/Drains/Airways     None                 Significant Labs:   Recent Results (from the past 72 hour(s))   ISTAT ACT-K    Collection Time: 08/10/22  5:12 PM   Result Value Ref Range    POC ACTIVATED CLOTTING TIME K 132 74 -  137 sec    Sample unknown    CBC with Differential    Collection Time: 08/11/22  6:41 AM   Result Value Ref Range    WBC 6.7 4.5 - 11.5 x10(3)/mcL    RBC 3.90 (L) 4.70 - 6.10 x10(6)/mcL    Hgb 12.5 (L) 14.0 - 18.0 gm/dL    Hct 39.5 (L) 42.0 - 52.0 %    .3 (H) 80.0 - 94.0 fL    MCH 32.1 (H) 27.0 - 31.0 pg    MCHC 31.6 (L) 33.0 - 36.0 mg/dL    RDW 11.9 11.5 - 17.0 %    Platelet 151 130 - 400 x10(3)/mcL    MPV 10.6 (H) 7.4 - 10.4 fL    Neut % 84.1 %    Lymph % 7.7 %    Mono % 7.1 %    Eos % 0.5 %    Basophil % 0.3 %    Lymph # 0.51 (L) 0.6 - 4.6 x10(3)/mcL    Neut # 5.6 2.1 - 9.2 x10(3)/mcL    Mono # 0.47 0.1 - 1.3 x10(3)/mcL    Eos # 0.03 0 - 0.9 x10(3)/mcL    Baso # 0.02 0 - 0.2 x10(3)/mcL    IG# 0.02 0 - 0.04 x10(3)/mcL    IG% 0.3 %    NRBC% 0.0 %       Telemetry:  NSR    Physical Exam  Vitals reviewed.   Constitutional:       Appearance: Normal appearance.   Cardiovascular:      Rate and Rhythm: Normal rate and regular rhythm.      Pulses: Normal pulses.      Heart sounds: Normal heart sounds.      Comments: Left neck incision C/D/I and right groin site benign-no redness, drainage, or edema.  Pulmonary:      Effort: Pulmonary effort is normal.      Breath sounds: Normal breath sounds.   Abdominal:      General: Abdomen is flat. Bowel sounds are normal.      Palpations: Abdomen is soft.   Musculoskeletal:         General: Normal range of motion.   Skin:     General: Skin is warm and dry.      Capillary Refill: Capillary refill takes less than 2 seconds.   Neurological:      Mental Status: He is alert and oriented to person, place, and time.         Current Inpatient Medications:    Current Facility-Administered Medications:     0.9%  NaCl infusion, , Intravenous, Once, Vaibhav Enriquez MD    albuterol-ipratropium 2.5 mg-0.5 mg/3 mL nebulizer solution 3 mL, 3 mL, Nebulization, Q1H PRN, Juan Ramon Gunn Jr., MD    ALPRAZolam tablet 2 mg, 2 mg, Oral, BID, Vaibhav Enriquez MD, 2 mg at 08/10/22 2045    amLODIPine tablet  2.5 mg, 2.5 mg, Oral, QAM, Vaibhav Enriquez MD, 2.5 mg at 08/11/22 0813    aspirin EC tablet 81 mg, 81 mg, Oral, Daily, Vaibhav Enriquez MD, 81 mg at 08/11/22 0814    atorvastatin tablet 40 mg, 40 mg, Oral, Daily, Vaibhav Enriquez MD, 40 mg at 08/11/22 0814    cetirizine tablet 10 mg, 10 mg, Oral, Daily, Vaibhav Enriquez MD, 10 mg at 08/11/22 0815    clopidogreL tablet 75 mg, 75 mg, Oral, Daily, Vaibhav Enriquez MD, 75 mg at 08/11/22 0814    ezetimibe tablet 10 mg, 10 mg, Oral, QHS, Vaibhav Enriquez MD, 10 mg at 08/10/22 2045    HYDROmorphone (PF) injection 0.2 mg, 0.2 mg, Intravenous, Q5 Min PRN, Juan Ramon Gunn Jr., MD    HYDROmorphone (PF) injection 0.4 mg, 0.4 mg, Intravenous, Q5 Min PRN, Juan Ramon Gunn Jr., MD, 0.4 mg at 08/10/22 1845    iopamidoL (ISOVUE-370) injection, , , PRN, Vaibhav Enriquez MD, 30 mL at 08/10/22 1624    meperidine (PF) injection 12.5 mg, 12.5 mg, Intravenous, Q10 Min PRN, Juan Ramon Gunn Jr., MD, 12.5 mg at 08/11/22 0313    metoprolol tartrate (LOPRESSOR) tablet 50 mg, 50 mg, Oral, BID, Vaibhav Enriquez MD, 50 mg at 08/11/22 0813    ondansetron injection 4 mg, 4 mg, Intravenous, Daily PRN, Juan Ramon Gunn Jr., MD    oxyCODONE-acetaminophen  mg per tablet 1 tablet, 1 tablet, Oral, Q4H PRN, Vaibhav Enriquez MD, 1 tablet at 08/11/22 1038    prochlorperazine injection Soln 5 mg, 5 mg, Intravenous, Q30 Min PRN, Juan Ramon Gunn Jr., MD    ranolazine 12 hr tablet 1,000 mg, 1,000 mg, Oral, BID, Vaibhav Enriquez MD, 1,000 mg at 08/11/22 0813    sildenafiL tablet 50 mg, 50 mg, Oral, Daily, Vaibhav Enriquez MD, 50 mg at 08/11/22 0814    sodium chloride 0.9% flush 10 mL, 10 mL, Intravenous, PRN, Vaibhav Enriquez MD    Current Outpatient Medications:     ALPRAZolam (XANAX) 2 MG Tab, Take 2 mg by mouth 2 (two) times daily., Disp: , Rfl:     amLODIPine (NORVASC) 2.5 MG tablet, Take 2.5 mg by mouth every morning., Disp: , Rfl:     aspirin (ECOTRIN) 81 MG EC tablet, Take 81 mg by mouth once daily., Disp: , Rfl:     atorvastatin  (LIPITOR) 40 MG tablet, Take 1 tablet by mouth once daily., Disp: , Rfl:     clopidogreL (PLAVIX) 75 mg tablet, Take 75 mg by mouth once daily., Disp: , Rfl:     ezetimibe (ZETIA) 10 mg tablet, Take 1 tablet by mouth every evening., Disp: , Rfl:     levocetirizine (XYZAL) 5 MG tablet, Take 5 mg by mouth once daily., Disp: , Rfl:     nebivoloL (BYSTOLIC) 10 MG Tab, Take 10 mg by mouth once daily., Disp: , Rfl:     ranolazine (RANEXA) 1,000 mg Tb12, Take 1,000 mg by mouth 2 (two) times daily., Disp: , Rfl:     sildenafiL (VIAGRA) 50 MG tablet, Take 50 mg by mouth once daily., Disp: , Rfl:     oxyCODONE-acetaminophen (PERCOCET) 5-325 mg per tablet, Take 1 tablet by mouth every 4 (four) hours as needed for Pain., Disp: 15 tablet, Rfl: 0    VTE Risk Mitigation (From admission, onward)    None          Assessment:   Impression:  Left carotid stenosis   -s/p TCAR (8.10.22)  CHF  Carotid bruit  DM 2  HTN  DM 2  CAD s/p CABG  Insomnia  Hypothyroidism.        Plan:     D/C home  Continue DAPT-Asa and plavix  Continue Statin/BB/entresto  No heavy lifting or driving for 5 days  Follow up with Dr. Enriquez 8.16.22 at 10:30am    Sindhu Okeefe, [facility]  Cardiology  [facility] - [facility] Lab Services  08/11/2022

## 2022-08-11 NOTE — ANESTHESIA POSTPROCEDURE EVALUATION
Anesthesia Post Evaluation    Patient: Javy Gunn    Procedure(s) Performed: Procedure(s) (LRB):  ANGIOGRAM, CAROTID (N/A)  Stent, Carotid W/ Protect    Final Anesthesia Type: general      Patient location during evaluation: PACU  Patient participation: Yes- Able to Participate  Level of consciousness: awake and alert and oriented  Post-procedure vital signs: reviewed and stable  Pain management: adequate  Airway patency: patent  MOHINI mitigation strategies: Verification of full reversal of neuromuscular block  PONV status at discharge: No PONV  Anesthetic complications: no      Cardiovascular status: blood pressure returned to baseline and stable  Respiratory status: spontaneous ventilation and unassisted  Hydration status: euvolemic  Follow-up not needed.  Comments: [facility]          Vitals Value Taken Time   /58 08/10/22 1931   Temp 36.7 08/10/22 1954   Pulse 70 08/10/22 1953   Resp 25 08/10/22 1953   SpO2 98 % 08/10/22 1953   Vitals shown include unvalidated device data.      No case tracking events are documented in the log.      Pain/Jojo Score: Pain Rating Prior to Med Admin: 10 (8/10/2022  6:45 PM)  Jojo Score: 10 (8/10/2022  7:00 PM)

## 2022-08-12 ENCOUNTER — PATIENT OUTREACH (OUTPATIENT)
Dept: ADMINISTRATIVE | Facility: CLINIC | Age: 62
End: 2022-08-12
Payer: MEDICARE

## 2022-08-12 NOTE — PROGRESS NOTES
C3 nurse spoke with Javy Gunn for a TCC post hospital discharge follow up call. The patient has a scheduled [facility] appointment with Vaibhav Enriquez [facility], cardiologist on 8/25/22 @ 10:30.

## 2022-09-14 NOTE — H&P
Patient name: Javy Gunn  MRN: [id number]  : 1960  Cath Lab Procedure H&P Update    Pre-Procedure Assessment:    I saw and examined the patient face to face. The patient has been re-evaluated and his condition is unchanged. The reason for admission, procedure and care is still present.  Based on the patients H&P, pre-procedure physical exam, relevant diagnostic studies, NPO status and information obtained from the patient, I determined the patient is an appropriate candidate for the proposed procedure and anesthesia planned. I further certify the anesthesia risks, benefits and options have been explained to the patient to which he agrees as documented on the procedural consent.

## 2025-07-18 DIAGNOSIS — H02.401 PTOSIS OF RIGHT EYELID: Primary | ICD-10-CM

## (undated) DEVICE — CHLORAPREP 10.5 ML APPLICATOR

## (undated) DEVICE — DRAPE MEDIUM SHEET 40X70IN

## (undated) DEVICE — Device

## (undated) DEVICE — ELECTRODE REM PLYHSV RETURN 9

## (undated) DEVICE — DEVICE INDEFLATOR BASIX

## (undated) DEVICE — COVER PROBE US 5.5X58L NON LTX

## (undated) DEVICE — SYS ENROUTE TCAR NEURPROTCT

## (undated) DEVICE — SUT SILK 2-0 SH 18IN BLACK

## (undated) DEVICE — DRAPE INCISE IOBAN 2 23X23IN

## (undated) DEVICE — GUIDEWIRE EMERALD 3MM 175X5CM

## (undated) DEVICE — CATH SHOCKWAVE M5 IVL 5.5X60MM

## (undated) DEVICE — SPONGE LAP XRAY DTECT 18X18IN

## (undated) DEVICE — SCALPEL #10 BLADE STRL DISP

## (undated) DEVICE — GOWN POLY REINF BRTH SLV XL

## (undated) DEVICE — KIT SURGIFLO HEMOSTATIC MATRIX

## (undated) DEVICE — SUT PROLENE 5/0 RB-1 36 IN

## (undated) DEVICE — HANDLE MEDIVAC SUC YANK BLBOUS

## (undated) DEVICE — SYR MEDALLION 10ML

## (undated) DEVICE — PENCIL ROCKER SWITCH 10FT CORD

## (undated) DEVICE — HEMOSTAT SURGICEL NU-KNIT 6X9

## (undated) DEVICE — GUIDEWIRE GLADIUS ES STR 300CM

## (undated) DEVICE — DRESSING ISLAND TELFA 4X5IN

## (undated) DEVICE — ADHESIVE DERMABOND ADVANCED

## (undated) DEVICE — SET ANGIO ACIST CVI ANGIOTOUCH

## (undated) DEVICE — CATH EAGLE EYE PLATINUM

## (undated) DEVICE — PAD DEFIB CADENCE ADULT R2

## (undated) DEVICE — GAUZE VISTEC XR DTECT 16 4X4IN

## (undated) DEVICE — SUT VICRYL 3-0 27 SH